# Patient Record
Sex: FEMALE | Race: ASIAN | NOT HISPANIC OR LATINO | URBAN - METROPOLITAN AREA
[De-identification: names, ages, dates, MRNs, and addresses within clinical notes are randomized per-mention and may not be internally consistent; named-entity substitution may affect disease eponyms.]

---

## 2023-08-26 NOTE — PROGRESS NOTES
Name: Tena Reyes      : 1985      MRN: [id number]  Encounter Provider: Lay Richey MD  Encounter Date: 2023   Encounter department: [address] [place] [address]     1. Well adult exam           Subjective     HPI  Review of Systems    No past medical history on file. No past surgical history on file. No family history on file. Social History     Socioeconomic History   • Marital status: /Civil Union     Spouse name: Not on file   • Number of children: Not on file   • Years of education: Not on file   • Highest education level: Not on file   Occupational History   • Not on file   Tobacco Use   • Smoking status: Not on file   • Smokeless tobacco: Not on file   Substance and Sexual Activity   • Alcohol use: Not on file   • Drug use: Not on file   • Sexual activity: Not on file   Other Topics Concern   • Not on file   Social History Narrative   • Not on file     Social Determinants of Health     Financial Resource Strain: Not on file   Food Insecurity: Not on file   Transportation Needs: Not on file   Physical Activity: Not on file   Stress: Not on file   Social Connections: Not on file   Intimate Partner Violence: Not on file   Housing Stability: Not on file     No current outpatient medications on file prior to visit. Not on File    There is no immunization history on file for this patient. Objective     There were no vitals taken for this visit.     Physical Exam  Lay Richey MD

## 2023-08-28 ENCOUNTER — OFFICE VISIT (OUTPATIENT)
Dept: FAMILY MEDICINE CLINIC | Facility: CLINIC | Age: 38
End: 2023-08-28
Payer: COMMERCIAL

## 2023-08-28 VITALS
OXYGEN SATURATION: 98 % | BODY MASS INDEX: 23.4 KG/M2 | RESPIRATION RATE: 16 BRPM | DIASTOLIC BLOOD PRESSURE: 64 MMHG | HEART RATE: 74 BPM | WEIGHT: 119.2 LBS | TEMPERATURE: 97.8 F | HEIGHT: 60 IN | SYSTOLIC BLOOD PRESSURE: 112 MMHG

## 2023-08-28 DIAGNOSIS — R53.83 OTHER FATIGUE: ICD-10-CM

## 2023-08-28 DIAGNOSIS — Z00.00 WELL ADULT EXAM: Primary | ICD-10-CM

## 2023-08-28 DIAGNOSIS — Z23 ENCOUNTER FOR IMMUNIZATION: ICD-10-CM

## 2023-08-28 DIAGNOSIS — Z11.59 NEED FOR HEPATITIS C SCREENING TEST: ICD-10-CM

## 2023-08-28 DIAGNOSIS — Z11.4 SCREENING FOR HIV (HUMAN IMMUNODEFICIENCY VIRUS): ICD-10-CM

## 2023-08-28 DIAGNOSIS — Z13.220 SCREENING CHOLESTEROL LEVEL: ICD-10-CM

## 2023-08-28 DIAGNOSIS — B35.3 TINEA PEDIS OF BOTH FEET: ICD-10-CM

## 2023-08-28 DIAGNOSIS — Z12.4 SCREENING FOR CERVICAL CANCER: ICD-10-CM

## 2023-08-28 PROCEDURE — 99385 PREV VISIT NEW AGE 18-39: CPT | Performed by: FAMILY MEDICINE

## 2023-08-28 RX ORDER — CLOTRIMAZOLE AND BETAMETHASONE DIPROPIONATE 10; .64 MG/G; MG/G
CREAM TOPICAL 2 TIMES DAILY
Qty: 30 G | Refills: 0 | Status: SHIPPED | OUTPATIENT
Start: 2023-08-28

## 2023-08-29 LAB
EXTERNAL HIV SCREEN: NORMAL
HCV AB SER-ACNC: NORMAL

## 2023-10-23 ENCOUNTER — OFFICE VISIT (OUTPATIENT)
Dept: FAMILY MEDICINE CLINIC | Facility: CLINIC | Age: 38
End: 2023-10-23
Payer: COMMERCIAL

## 2023-10-23 VITALS
HEART RATE: 68 BPM | TEMPERATURE: 98.4 F | OXYGEN SATURATION: 98 % | HEIGHT: 60 IN | WEIGHT: 117.9 LBS | BODY MASS INDEX: 23.15 KG/M2 | SYSTOLIC BLOOD PRESSURE: 114 MMHG | RESPIRATION RATE: 18 BRPM | DIASTOLIC BLOOD PRESSURE: 68 MMHG

## 2023-10-23 DIAGNOSIS — Z23 ENCOUNTER FOR IMMUNIZATION: ICD-10-CM

## 2023-10-23 DIAGNOSIS — R53.83 OTHER FATIGUE: Primary | ICD-10-CM

## 2023-10-23 DIAGNOSIS — Z12.4 SCREENING FOR CERVICAL CANCER: ICD-10-CM

## 2023-10-23 DIAGNOSIS — E78.00 PURE HYPERCHOLESTEROLEMIA: ICD-10-CM

## 2023-10-23 PROCEDURE — 99213 OFFICE O/P EST LOW 20 MIN: CPT | Performed by: FAMILY MEDICINE

## 2023-10-23 PROCEDURE — 90471 IMMUNIZATION ADMIN: CPT | Performed by: FAMILY MEDICINE

## 2023-10-23 PROCEDURE — 90686 IIV4 VACC NO PRSV 0.5 ML IM: CPT | Performed by: FAMILY MEDICINE

## 2023-10-23 NOTE — PROGRESS NOTES
Name: Geni Zepeda      : 1985      MRN: [id number]  Encounter Provider: Poonam Pritchard MD  Encounter Date: 10/23/2023   Encounter department: [address] [place] [address]     1. Other fatigue    2. Screening for cervical cancer    3. Encounter for immunization  -     influenza vaccine, quadrivalent, 0.5 mL, preservative-free, for adult and pediatric patients 6 mos+ (AFLURIA, FLUARIX, FLULAVAL, FLUZONE)    4. Pure hypercholesterolemia  -     Lipid Panel with Direct LDL reflex; Future        Depression Screening and Follow-up Plan: Patient was screened for depression during today's encounter. They screened negative with a PHQ-2 score of 0. Subjective     Is a 19-year-old female here today for checking up on recent lab work-up. Patient was found to have a normal lab work and she has a cholesterol elevated at 202. There is a larynx language barrier but we do of an  and we discussed with her the need to for a low-fat diet I have given her some information she can read some [place]. We will check her back in approximately 6 months for this problem. Review of Systems    History reviewed. No pertinent past medical history. History reviewed. No pertinent surgical history.   Family History   Problem Relation Age of Onset   • Diabetes Father      Social History     Socioeconomic History   • Marital status: /Civil Union     Spouse name: None   • Number of children: None   • Years of education: None   • Highest education level: None   Occupational History   • Occupation: sewing   Tobacco Use   • Smoking status: Never   • Smokeless tobacco: Never   Vaping Use   • Vaping Use: Never used   Substance and Sexual Activity   • Alcohol use: Not Currently   • Drug use: Never   • Sexual activity: Yes     Partners: Male   Other Topics Concern   • None   Social History Narrative   • None     Social Determinants of Health     Financial Resource Strain: Not on file Food Insecurity: Not on file   Transportation Needs: Not on file   Physical Activity: Not on file   Stress: Not on file   Social Connections: Not on file   Intimate Partner Violence: Not on file   Housing Stability: Not on file     Current Outpatient Medications on File Prior to Visit   Medication Sig   • clotrimazole-betamethasone (Shawna Pintos) 1-0.05 % cream Apply topically 2 (two) times a day (Patient not taking: Reported on 10/23/2023)     No Known Allergies  Immunization History   Administered Date(s) Administered   • Influenza, injectable, quadrivalent, preservative free 0.5 mL 10/23/2023   • MMR 05/24/2019, 05/26/2019   • Tdap 05/26/2019       Objective     /68 (BP Location: Left arm, Patient Position: Sitting, Cuff Size: Standard)   Pulse 68   Temp 98.4 °F (36.9 °C)   Resp 18   Ht 5' (1.524 m)   Wt 53.5 kg (117 lb 14.4 oz)   SpO2 98%   BMI 23.03 kg/m²     Physical Exam  Loretta Hernandez MD

## 2024-04-04 ENCOUNTER — VBI (OUTPATIENT)
Dept: ADMINISTRATIVE | Facility: OTHER | Age: 39
End: 2024-04-04

## 2024-09-09 ENCOUNTER — OFFICE VISIT (OUTPATIENT)
Dept: FAMILY MEDICINE CLINIC | Facility: CLINIC | Age: 39
End: 2024-09-09
Payer: COMMERCIAL

## 2024-09-09 VITALS
HEART RATE: 81 BPM | WEIGHT: 115.6 LBS | SYSTOLIC BLOOD PRESSURE: 114 MMHG | HEIGHT: 61 IN | TEMPERATURE: 98 F | BODY MASS INDEX: 21.83 KG/M2 | DIASTOLIC BLOOD PRESSURE: 78 MMHG | OXYGEN SATURATION: 98 %

## 2024-09-09 DIAGNOSIS — D64.9 ANEMIA, UNSPECIFIED TYPE: ICD-10-CM

## 2024-09-09 DIAGNOSIS — R09.82 PND (POST-NASAL DRIP): ICD-10-CM

## 2024-09-09 DIAGNOSIS — Z12.4 CERVICAL CANCER SCREENING: ICD-10-CM

## 2024-09-09 DIAGNOSIS — Z12.31 ENCOUNTER FOR SCREENING MAMMOGRAM FOR BREAST CANCER: ICD-10-CM

## 2024-09-09 DIAGNOSIS — Z13.0 SCREENING FOR DEFICIENCY ANEMIA: ICD-10-CM

## 2024-09-09 DIAGNOSIS — J01.00 ACUTE NON-RECURRENT MAXILLARY SINUSITIS: ICD-10-CM

## 2024-09-09 DIAGNOSIS — Z00.00 ANNUAL PHYSICAL EXAM: Primary | ICD-10-CM

## 2024-09-09 DIAGNOSIS — E78.00 PURE HYPERCHOLESTEROLEMIA: ICD-10-CM

## 2024-09-09 PROCEDURE — 99213 OFFICE O/P EST LOW 20 MIN: CPT | Performed by: FAMILY MEDICINE

## 2024-09-09 PROCEDURE — 99396 PREV VISIT EST AGE 40-64: CPT | Performed by: FAMILY MEDICINE

## 2024-09-09 RX ORDER — AMOXICILLIN 500 MG/1
500 CAPSULE ORAL EVERY 12 HOURS SCHEDULED
Qty: 14 CAPSULE | Refills: 0 | Status: SHIPPED | OUTPATIENT
Start: 2024-09-09 | End: 2024-09-16

## 2024-09-09 RX ORDER — LORATADINE 10 MG/1
10 TABLET ORAL DAILY
Qty: 30 TABLET | Refills: 0 | Status: SHIPPED | OUTPATIENT
Start: 2024-09-09

## 2024-09-09 NOTE — PATIENT INSTRUCTIONS
"Try [facility]  Patient Education     Routine physical for adults   The Basics   Written by the doctors and editors at [facility]   What is a physical? -- A physical is a routine visit, or \"check-up,\" with your doctor. You might also hear it called a \"wellness visit\" or \"preventive visit.\"  During each visit, the doctor will:   Ask about your physical and mental health   Ask about your habits, behaviors, and lifestyle   Do an exam   Give you vaccines if needed   Talk to you about any medicines you take   Give advice about your health   Answer your questions  Getting regular check-ups is an important part of taking care of your health. It can help your doctor find and treat any problems you have. But it's also important for preventing health problems.  A routine physical is different from a \"sick visit.\" A sick visit is when you see a doctor because of a health concern or problem. Since physicals are scheduled ahead of time, you can think about what you want to ask the doctor.  How often should I get a physical? -- It depends on your age and health. In general, for people age 21 years and older:   If you are younger than 50 years, you might be able to get a physical every 3 years.   If you are 50 years or older, your doctor might recommend a physical every year.  If you have an ongoing health condition, like diabetes or high blood pressure, your doctor will probably want to see you more often.  What happens during a physical? -- In general, each visit will include:   Physical exam - The doctor or nurse will check your height, weight, heart rate, and blood pressure. They will also look at your eyes and ears. They will ask about how you are feeling and whether you have any symptoms that bother you.   Medicines - It's a good idea to bring a list of all the medicines you take to each doctor visit. Your doctor will talk to you about your medicines and answer any questions. Tell them if you are having any side effects that " "bother you. You should also tell them if you are having trouble paying for any of your medicines.   Habits and behaviors - This includes:   Your diet   Your exercise habits   Whether you smoke, drink alcohol, or use drugs   Whether you are sexually active   Whether you feel safe at home  Your doctor will talk to you about things you can do to improve your health and lower your risk of health problems. They will also offer help and support. For example, if you want to quit smoking, they can give you advice and might prescribe medicines. If you want to improve your diet or get more physical activity, they can help you with this, too.   Lab tests, if needed - The tests you get will depend on your age and situation. For example, your doctor might want to check your:   Cholesterol   Blood sugar   Iron level   Vaccines - The recommended vaccines will depend on your age, health, and what vaccines you already had. Vaccines are very important because they can prevent certain serious or deadly infections.   Discussion of screening - \"Screening\" means checking for diseases or other health problems before they cause symptoms. Your doctor can recommend screening based on your age, risk, and preferences. This might include tests to check for:   Cancer, such as breast, prostate, cervical, ovarian, colorectal, prostate, lung, or skin cancer   Sexually transmitted infections, such as chlamydia and gonorrhea   Mental health conditions like depression and anxiety  Your doctor will talk to you about the different types of screening tests. They can help you decide which screenings to have. They can also explain what the results might mean.   Answering questions - The physical is a good time to ask the doctor or nurse questions about your health. If needed, they can refer you to other doctors or specialists, too.  Adults older than 65 years often need other care, too. As you get older, your doctor will talk to you about:   How to prevent " falling at home   Hearing or vision tests   Memory testing   How to take your medicines safely   Making sure that you have the help and support you need at home  All topics are updated as new evidence becomes available and our peer review process is complete.  This topic retrieved from [employer] on: May 02, 2024.  Topic 695399 Version 1.0  Release: 32.4.3 - C32.122  © 2024 UpToDate, Inc. and/or its affiliates. All rights reserved.  Consumer Information Use and Disclaimer   Disclaimer: This generalized information is a limited summary of diagnosis, treatment, and/or medication information. It is not meant to be comprehensive and should be used as a tool to help the user understand and/or assess potential diagnostic and treatment options. It does NOT include all information about conditions, treatments, medications, side effects, or risks that may apply to a specific patient. It is not intended to be medical advice or a substitute for the medical advice, diagnosis, or treatment of a health care provider based on the health care provider's examination and assessment of a patient's specific and unique circumstances. Patients must speak with a health care provider for complete information about their health, medical questions, and treatment options, including any risks or benefits regarding use of medications. This information does not endorse any treatments or medications as safe, effective, or approved for treating a specific patient. UpToDate, Inc. and its affiliates disclaim any warranty or liability relating to this information or the use thereof.The use of this information is governed by the Terms of Use, available at https://www.wolterskluwer.com/en/know/clinical-effectiveness-terms. 2024© UpToDate, Inc. and its affiliates and/or licensors. All rights reserved.  Copyright   © 2024 UpToDate, Inc. and/or its affiliates. All rights reserved.

## 2024-09-09 NOTE — PROGRESS NOTES
Adult Annual Physical  Name: Shorty Sherman      : 1985      MRN: [id number]  Encounter Provider: Pablito Slater MD  Encounter Date: 2024   Encounter department: [facility]    Assessment & Plan   1. Annual physical exam  -     CBC and differential; Future  -     Comprehensive metabolic panel; Future  -     Lipid panel; Future  2. Encounter for screening mammogram for breast cancer  3. Cervical cancer screening  -     Ambulatory Referral to Obstetrics / Gynecology; Future  4. Acute non-recurrent maxillary sinusitis  -     amoxicillin (AMOXIL) 500 mg capsule; Take 1 capsule (500 mg total) by mouth every 12 (twelve) hours for 7 days  5. Pure hypercholesterolemia  -     Lipid panel; Future  6. Anemia, unspecified type  -     Comprehensive metabolic panel; Future  7. Screening for deficiency anemia  -     CBC and differential; Future  8. PND (post-nasal drip)  -     loratadine (CLARITIN) 10 mg tablet; Take 1 tablet (10 mg total) by mouth daily        Immunizations and preventive care screenings were discussed with patient today. Appropriate education was printed on patient's after visit summary.    Counseling:  Alcohol/drug use: discussed moderation in alcohol intake, the recommendations for healthy alcohol use, and avoidance of illicit drug use.  Dental Health: discussed importance of regular tooth brushing, flossing, and dental visits.  Injury prevention: discussed safety/seat belts, safety helmets, smoke detectors, carbon dioxide detectors, and smoking near bedding or upholstery.  Sexual health: discussed sexually transmitted diseases, partner selection, use of condoms, avoidance of unintended pregnancy, and contraceptive alternatives.  Exercise: the importance of regular exercise/physical activity was discussed. Recommend exercise 3-5 times per week for at least 30 minutes.          History of Present Illness       Adult Annual Physical:  Patient presents for annual physical. Pt here to  "establish care  History obtained in patient's preferred language  Feels has some URI symptom .     Diet and Physical Activity:  - Diet/Nutrition: poor diet. decreased appetite  - Exercise: 1-2 times a week on average. busy with work    Depression Screening:  - PHQ-2 Score: 0    General Health:  - Sleep: sleeps poorly. interrupted sleep due to child  - Hearing: normal hearing bilateral ears.  - Vision: wears glasses.  - Dental: brushes teeth twice daily and no dental visits for > 1 year.    /GYN Health:  - Follows with GYN: no.   - History of STDs: no      Review of Systems   Constitutional:  Negative for chills and fever.   HENT:  Positive for congestion, postnasal drip and sore throat.         2 week history of URI, slowly improving   Respiratory:  Positive for cough. Negative for chest tightness and shortness of breath.    Cardiovascular:  Negative for chest pain.   Gastrointestinal:  Negative for abdominal pain.   Allergic/Immunologic: Negative for environmental allergies.   Neurological:  Negative for dizziness, light-headedness and headaches.   Psychiatric/Behavioral:  Negative for sleep disturbance.        Objective   /78 (BP Location: Right arm, Patient Position: Sitting, Cuff Size: Standard)   Pulse 81   Temp 98 °F (36.7 °C) (Temporal)   Ht 5' 1.25\" (1.556 m)   Wt 52.4 kg (115 lb 9.6 oz)   LMP 08/22/2024 (Approximate)   SpO2 98%   Breastfeeding No   BMI 21.66 kg/m²     Physical Exam  Vitals reviewed.   Constitutional:       General: She is not in acute distress.     Appearance: Normal appearance. She is well-developed. She is not ill-appearing.   HENT:      Head: Normocephalic and atraumatic.      Mouth/Throat:      Comments: Pnd noted   Eyes:      Conjunctiva/sclera: Conjunctivae normal.   Cardiovascular:      Rate and Rhythm: Normal rate and regular rhythm.      Heart sounds: No murmur heard.  Pulmonary:      Effort: Pulmonary effort is normal. No respiratory distress.      Breath sounds: " "Normal breath sounds.   Abdominal:      Palpations: Abdomen is soft.      Tenderness: There is no abdominal tenderness.   Musculoskeletal:         General: No swelling.      Cervical back: Neck supple.   Skin:     General: Skin is warm and dry.      Capillary Refill: Capillary refill takes less than 2 seconds.   Neurological:      Mental Status: She is alert.   Psychiatric:         Mood and Affect: Mood normal.           Pablito Slater M.D.  Family Medicine    Please excuse any \"sound-alike\" errors that may have ocurred during the process of dictation. Parts of this note have been dictated and there may be errors present in the transcription process. Thank you.    "

## 2024-09-12 LAB
ALBUMIN SERPL-MCNC: 4 G/DL (ref 3.5–5.7)
ALP SERPL-CCNC: 32 U/L (ref 35–120)
ALT SERPL-CCNC: 9 U/L
ANION GAP SERPL CALCULATED.3IONS-SCNC: 7 MMOL/L (ref 3–11)
AST SERPL-CCNC: 11 U/L
BASOPHILS # BLD AUTO: 0.1 THOU/CMM (ref 0–0.1)
BASOPHILS NFR BLD AUTO: 1 %
BILIRUB SERPL-MCNC: 0.5 MG/DL (ref 0.2–1)
BUN SERPL-MCNC: 14 MG/DL (ref 7–25)
CALCIUM SERPL-MCNC: 8.9 MG/DL (ref 8.5–10.1)
CHLORIDE SERPL-SCNC: 105 MMOL/L (ref 100–109)
CHOLEST SERPL-MCNC: 177 MG/DL
CHOLEST/HDLC SERPL: 3.5 {RATIO}
CO2 SERPL-SCNC: 26 MMOL/L (ref 21–31)
CREAT SERPL-MCNC: 0.54 MG/DL (ref 0.4–1.1)
CYTOLOGY CMNT CVX/VAG CYTO-IMP: ABNORMAL
DIFFERENTIAL METHOD BLD: NORMAL
EOSINOPHIL # BLD AUTO: 0.2 THOU/CMM (ref 0–0.5)
EOSINOPHIL NFR BLD AUTO: 2 %
ERYTHROCYTE [DISTWIDTH] IN BLOOD BY AUTOMATED COUNT: 12.5 % (ref 12–16)
GFR/BSA.PRED SERPLBLD CYS-BASED-ARV: 119 ML/MIN/{1.73_M2}
GLUCOSE SERPL-MCNC: 85 MG/DL (ref 65–99)
HCT VFR BLD AUTO: 39.5 % (ref 35–43)
HDLC SERPL-MCNC: 51 MG/DL (ref 23–92)
HGB BLD-MCNC: 13.2 G/DL (ref 11.5–14.5)
LDLC SERPL CALC-MCNC: 110 MG/DL
LYMPHOCYTES # BLD AUTO: 2.6 THOU/CMM (ref 1–3)
LYMPHOCYTES NFR BLD AUTO: 40 %
MCH RBC QN AUTO: 30.2 PG (ref 26–34)
MCHC RBC AUTO-ENTMCNC: 33.4 G/DL (ref 32–37)
MCV RBC AUTO: 90 FL (ref 80–100)
MONOCYTES # BLD AUTO: 0.5 THOU/CMM (ref 0.3–1)
MONOCYTES NFR BLD AUTO: 8 %
NEUTROPHILS # BLD AUTO: 3.2 THOU/CMM (ref 1.8–7.8)
NEUTROPHILS NFR BLD AUTO: 49 %
NONHDLC SERPL-MCNC: 126 MG/DL
PLATELET # BLD AUTO: 173 THOU/CMM (ref 140–350)
PMV BLD REES-ECKER: 10.7 FL (ref 7.5–11.3)
POTASSIUM SERPL-SCNC: 4.1 MMOL/L (ref 3.5–5.2)
PROT SERPL-MCNC: 7.4 G/DL (ref 6.3–8.3)
RBC # BLD AUTO: 4.37 MILL/CMM (ref 3.7–4.7)
SODIUM SERPL-SCNC: 138 MMOL/L (ref 135–145)
TRIGL SERPL-MCNC: 82 MG/DL
WBC # BLD AUTO: 6.6 THOU/CMM (ref 4–10)

## 2024-10-05 DIAGNOSIS — R09.82 PND (POST-NASAL DRIP): ICD-10-CM

## 2024-10-05 RX ORDER — LORATADINE 10 MG/1
10 TABLET ORAL DAILY
Qty: 30 TABLET | Refills: 5 | Status: SHIPPED | OUTPATIENT
Start: 2024-10-05

## 2024-12-09 ENCOUNTER — OFFICE VISIT (OUTPATIENT)
Dept: FAMILY MEDICINE CLINIC | Facility: CLINIC | Age: 39
End: 2024-12-09
Payer: COMMERCIAL

## 2024-12-09 VITALS
BODY MASS INDEX: 22.58 KG/M2 | DIASTOLIC BLOOD PRESSURE: 70 MMHG | TEMPERATURE: 97.9 F | HEART RATE: 63 BPM | SYSTOLIC BLOOD PRESSURE: 102 MMHG | OXYGEN SATURATION: 100 % | HEIGHT: 61 IN | WEIGHT: 119.6 LBS

## 2024-12-09 DIAGNOSIS — Z12.31 ENCOUNTER FOR SCREENING MAMMOGRAM FOR BREAST CANCER: ICD-10-CM

## 2024-12-09 DIAGNOSIS — E78.00 ELEVATED LDL CHOLESTEROL LEVEL: Primary | ICD-10-CM

## 2024-12-09 PROCEDURE — 99213 OFFICE O/P EST LOW 20 MIN: CPT | Performed by: FAMILY MEDICINE

## 2024-12-09 NOTE — PROGRESS NOTES
Name: Shorty Sherman      : 1985      MRN: [id number]  Encounter Provider: Pablito Slater MD  Encounter Date: 2024   Encounter department: [facility]    Assessment & Plan  Elevated LDL cholesterol level  Mildly elevated LDL level, will instruct patient to monitor processed food intake and increase exercise activities  Follow-up in September for annual physical       Encounter for screening mammogram for breast cancer    Orders:    Mammo screening bilateral w 3d and cad; Future         History of Present Illness       HPI    39-year-old female patient presents for follow-up regarding her chronic medical conditions.  Reviewed most recent blood work completed on 2024.  Patient reports she has been doing better in terms of her diet, exercising regularly.  Has appointment with OB/GYN coming up.  Agreeable to mammogram for breast cancer screening.    Review of Systems   Constitutional:  Negative for chills and fever.   HENT:  Negative for congestion.    Respiratory:  Negative for chest tightness and shortness of breath.    Cardiovascular:  Negative for chest pain.   Gastrointestinal:  Negative for abdominal pain, constipation, diarrhea, nausea and vomiting.   Neurological:  Negative for dizziness, light-headedness and headaches.   Psychiatric/Behavioral:  Negative for sleep disturbance.        History reviewed. No pertinent past medical history.  History reviewed. No pertinent surgical history.  Family History   Problem Relation Age of Onset    Diabetes Father      Social History     Tobacco Use    Smoking status: Never    Smokeless tobacco: Never   Vaping Use    Vaping status: Never Used   Substance and Sexual Activity    Alcohol use: Not Currently    Drug use: Never    Sexual activity: Yes     Partners: Male     Current Outpatient Medications on File Prior to Visit   Medication Sig    clotrimazole-betamethasone (LOTRISONE) 1-0.05 % cream Apply topically 2 (two) times a day     "loratadine (CLARITIN) 10 mg tablet TAKE 1 TABLET BY MOUTH EVERY DAY     No Known Allergies  Immunization History   Administered Date(s) Administered    COVID-19 PFIZER VACCINE 0.3 ML IM 08/09/2021, 08/30/2021    Influenza, injectable, quadrivalent, preservative free 0.5 mL 10/23/2023    MMR 05/24/2019, 05/26/2019    Tdap 05/26/2019     Objective   /70 (BP Location: Right arm, Patient Position: Sitting, Cuff Size: Standard)   Pulse 63   Temp 97.9 °F (36.6 °C) (Temporal)   Ht 5' 1.25\" (1.556 m)   Wt 54.3 kg (119 lb 9.6 oz)   LMP 11/16/2024 (Exact Date)   SpO2 100%   BMI 22.41 kg/m²     Physical Exam  Vitals reviewed.   Constitutional:       General: She is not in acute distress.     Appearance: Normal appearance. She is not ill-appearing, toxic-appearing or diaphoretic.   Cardiovascular:      Rate and Rhythm: Normal rate and regular rhythm.      Pulses: Normal pulses.      Heart sounds: Normal heart sounds. No murmur heard.  Pulmonary:      Effort: Pulmonary effort is normal. No respiratory distress.      Breath sounds: Normal breath sounds.   Abdominal:      General: Abdomen is flat.   Musculoskeletal:         General: No swelling or deformity.   Skin:     General: Skin is warm and dry.      Capillary Refill: Capillary refill takes less than 2 seconds.      Coloration: Skin is not jaundiced.   Neurological:      General: No focal deficit present.      Mental Status: She is alert and oriented to person, place, and time.   Psychiatric:         Mood and Affect: Mood normal.         Pablito Slater M.D.  Family Medicine    Please excuse any \"sound-alike\" errors that may have ocurred during the process of dictation. Parts of this note have been dictated and there may be errors present in the transcription process. Thank you.    "

## 2024-12-09 NOTE — ASSESSMENT & PLAN NOTE
Mildly elevated LDL level, will instruct patient to monitor processed food intake and increase exercise activities  Follow-up in September for annual physical

## 2024-12-10 ENCOUNTER — HOSPITAL ENCOUNTER (OUTPATIENT)
Dept: RADIOLOGY | Facility: MEDICAL CENTER | Age: 39
Discharge: HOME | End: 2024-12-10
Payer: COMMERCIAL

## 2024-12-10 VITALS — HEIGHT: 61 IN | WEIGHT: 119 LBS | BODY MASS INDEX: 22.47 KG/M2

## 2024-12-10 DIAGNOSIS — Z12.31 ENCOUNTER FOR SCREENING MAMMOGRAM FOR BREAST CANCER: ICD-10-CM

## 2024-12-10 PROCEDURE — 77063 BREAST TOMOSYNTHESIS BI: CPT

## 2024-12-10 PROCEDURE — 77067 SCR MAMMO BI INCL CAD: CPT

## 2024-12-13 ENCOUNTER — RESULTS FOLLOW-UP (OUTPATIENT)
Dept: FAMILY MEDICINE CLINIC | Facility: CLINIC | Age: 39
End: 2024-12-13

## 2024-12-23 ENCOUNTER — CONSULT (OUTPATIENT)
Dept: OBGYN CLINIC | Facility: MEDICAL CENTER | Age: 39
End: 2024-12-23
Payer: COMMERCIAL

## 2024-12-23 VITALS
WEIGHT: 119 LBS | BODY MASS INDEX: 22.47 KG/M2 | HEIGHT: 61 IN | HEART RATE: 68 BPM | SYSTOLIC BLOOD PRESSURE: 112 MMHG | DIASTOLIC BLOOD PRESSURE: 72 MMHG | OXYGEN SATURATION: 99 %

## 2024-12-23 DIAGNOSIS — R92.8 ABNORMAL MAMMOGRAM: ICD-10-CM

## 2024-12-23 DIAGNOSIS — Z12.4 SCREENING FOR CERVICAL CANCER: ICD-10-CM

## 2024-12-23 DIAGNOSIS — Z01.419 ENCOUNTER FOR GYNECOLOGICAL EXAMINATION (GENERAL) (ROUTINE) WITHOUT ABNORMAL FINDINGS: Primary | ICD-10-CM

## 2024-12-23 PROCEDURE — G0145 SCR C/V CYTO,THINLAYER,RESCR: HCPCS | Performed by: CLINICAL NURSE SPECIALIST

## 2024-12-23 PROCEDURE — G0476 HPV COMBO ASSAY CA SCREEN: HCPCS | Performed by: CLINICAL NURSE SPECIALIST

## 2024-12-23 PROCEDURE — 99386 PREV VISIT NEW AGE 40-64: CPT | Performed by: CLINICAL NURSE SPECIALIST

## 2024-12-23 NOTE — PROGRESS NOTES
Name: Shorty Sherman      : 1985      MRN: [id number]  Encounter Provider: ZACK Madrigal  Encounter Date: 2024   Encounter department: [facility]    :  Assessment & Plan  Encounter for gynecological examination (general) (routine) without abnormal findings  Annual GYN examination completed today.   Health maintenance reviewed/updated as appropriate  Risk prevention and anticipatory guidance provided including:  Age related Calcium and vitamin D intake  Encouraged Breast self exams and to call with changes.  Dietary and lifestyle recommendations based on her age and weight. body mass index is 22.48 kg/m²..    Tobacco and alcohol use, intervention ordered if applicable.   Condom use for prevention of STI's. declined STI Screening.     Screening for cervical cancer    Orders:  •  Liquid-based pap, screening  •  Ambulatory Referral to Obstetrics / Gynecology    Abnormal mammogram  Asymmetry on Left screening mammogram eariler this month. Has f/u dx mammo and US scheduled in February            Subjective:      History of Present Illness     Shorty Sherman is a 39 y.o. female.She is here for Gynecologic Exam (Patient presents for a routine annual visit/Last Pap Smear- never /LMP- 24 /Birth control- none /Mammogram- 12/10/24 gisele next year//Currently sexually active/No family history of uterine, ovarian, cervical or breast cancer. /)    Pt is Jeff speaking- services/[employer] used. ID#: [id number]    Patient denies menstrual complaints. Regular monthly menses, not excessive  She denies any C/O abnormal vaginal discharge or irritation.   Denies Urinary complaints or breast changes  Did just have mammo earlier this month and there was a left asym.   Sexually active: yes;  Monogamous/single partner  Denies C/O related to intimacy/sexual activity  Contraception: vasectomy    She reports she feels safe at home.   Lifestyle/exercise: Some  "exercise, but not consistent  Dietary calcium/vit D  intake: Moderate  intake     HEALTH MAINTENANCE SCREENINGS:     Previous pap smears and ASCCP screening guidelines have been reviewed.   Last Pap:  Not on file; Next due now  History of abnormal pap: NO ,   Last mammogram:  12/10/2024    Hereditary Cancer Screening  FH Breast/Ovarian cancer: denies  FH Uterine cancer: denies  FH Colon cancer: denies    Substance Abuse Screening Completed. See hx and flowsheet.    Review of Systems   Constitutional:  Negative for appetite change, chills, fatigue, fever and unexpected weight change.   HENT: Negative.     Eyes: Negative.    Respiratory:  Negative for chest tightness and shortness of breath.    Cardiovascular:  Negative for chest pain and palpitations.   Gastrointestinal:  Negative for abdominal pain, constipation and vomiting.   Endocrine: Negative for cold intolerance and heat intolerance.   Genitourinary:         As per HPI   Musculoskeletal:  Negative for back pain, joint swelling and neck pain.   Skin:  Negative for color change and rash.   Neurological:  Negative for dizziness, weakness and numbness.   Hematological:  Does not bruise/bleed easily.   Psychiatric/Behavioral: Negative.       The following portions of the patient's history were reviewed and updated as appropriate: allergies, current medications, past family history, past medical history, past social history, past surgical history, and problem list.           Objective   /72 (BP Location: Left arm, Patient Position: Sitting, Cuff Size: Standard)   Pulse 68   Ht 5' 1\" (1.549 m)   Wt 54 kg (119 lb)   LMP 12/16/2024 (Exact Date)   SpO2 99%   BMI 22.48 kg/m²     Physical Exam  Constitutional:       General: She is not in acute distress.     Appearance: Normal appearance.   Genitourinary:      Vulva and rectum normal.      No lesions in the vagina.      Right Labia: No rash or lesions.     Left Labia: No lesions or rash.     No vaginal " discharge, erythema, tenderness or bleeding.      No vaginal prolapse present.       Right Adnexa: not tender and no mass present.     Left Adnexa: not tender and no mass present.     Cervical friability and nabothian cyst present.      No cervical motion tenderness or discharge.      Uterus is not enlarged or tender.      No urethral prolapse present.      Pelvic exam was performed with patient in the lithotomy position.   Breasts:     Breasts are symmetrical.      Right: No inverted nipple, mass, nipple discharge, skin change or tenderness.      Left: No inverted nipple, mass, nipple discharge, skin change or tenderness.   HENT:      Head: Normocephalic and atraumatic.   Cardiovascular:      Rate and Rhythm: Normal rate.      Heart sounds: No murmur heard.  Pulmonary:      Effort: Pulmonary effort is normal.      Breath sounds: Normal breath sounds.   Abdominal:      General: There is no distension.      Palpations: Abdomen is soft.      Tenderness: There is no abdominal tenderness.   Musculoskeletal:         General: Normal range of motion.      Cervical back: Normal range of motion.   Lymphadenopathy:      Cervical: No cervical adenopathy.   Neurological:      Mental Status: She is alert and oriented to person, place, and time.   Skin:     General: Skin is warm and dry.   Psychiatric:         Mood and Affect: Mood normal.         Behavior: Behavior normal.   Vitals reviewed.

## 2024-12-23 NOTE — ASSESSMENT & PLAN NOTE
Asymmetry on Left screening mammogram eariler this month. Has f/u dx mammo and US scheduled in February

## 2024-12-24 LAB
HPV HR 12 DNA CVX QL NAA+PROBE: NEGATIVE
HPV16 DNA CVX QL NAA+PROBE: NEGATIVE
HPV18 DNA CVX QL NAA+PROBE: NEGATIVE

## 2024-12-27 ENCOUNTER — RESULTS FOLLOW-UP (OUTPATIENT)
Dept: OBGYN CLINIC | Facility: MEDICAL CENTER | Age: 39
End: 2024-12-27

## 2024-12-27 LAB
LAB AP GYN PRIMARY INTERPRETATION: NORMAL
Lab: NORMAL

## 2024-12-31 ENCOUNTER — TELEPHONE (OUTPATIENT)
Age: 39
End: 2024-12-31

## 2024-12-31 NOTE — TELEPHONE ENCOUNTER
Contacted patient with  to let her know that her pap and hpv results are normal and negative. Pt verbalized understanding.

## 2025-02-24 ENCOUNTER — HOSPITAL ENCOUNTER (OUTPATIENT)
Dept: MAMMOGRAPHY | Facility: CLINIC | Age: 40
Discharge: HOME | End: 2025-02-24
Payer: COMMERCIAL

## 2025-02-24 ENCOUNTER — HOSPITAL ENCOUNTER (OUTPATIENT)
Dept: ULTRASOUND IMAGING | Facility: CLINIC | Age: 40
Discharge: HOME | End: 2025-02-24
Payer: COMMERCIAL

## 2025-02-24 DIAGNOSIS — R92.8 ABNORMAL SCREENING MAMMOGRAM: ICD-10-CM

## 2025-02-24 PROCEDURE — 76642 ULTRASOUND BREAST LIMITED: CPT

## 2025-02-24 PROCEDURE — 77065 DX MAMMO INCL CAD UNI: CPT

## 2025-02-24 PROCEDURE — G0279 TOMOSYNTHESIS, MAMMO: HCPCS

## 2025-02-27 ENCOUNTER — RESULTS FOLLOW-UP (OUTPATIENT)
Dept: FAMILY MEDICINE CLINIC | Facility: CLINIC | Age: 40
End: 2025-02-27

## 2025-08-11 ENCOUNTER — PATIENT OUTREACH (OUTPATIENT)
Facility: HOSPITAL | Age: 40
End: 2025-08-11

## 2025-08-11 ENCOUNTER — HOSPITAL ENCOUNTER (OUTPATIENT)
Dept: MAMMOGRAPHY | Facility: CLINIC | Age: 40
Discharge: HOME | End: 2025-08-11
Payer: OTHER GOVERNMENT